# Patient Record
Sex: FEMALE | URBAN - METROPOLITAN AREA
[De-identification: names, ages, dates, MRNs, and addresses within clinical notes are randomized per-mention and may not be internally consistent; named-entity substitution may affect disease eponyms.]

---

## 2022-02-01 ENCOUNTER — OUT OF OFFICE VISIT (OUTPATIENT)
Dept: URBAN - METROPOLITAN AREA MEDICAL CENTER 9 | Facility: MEDICAL CENTER | Age: 47
End: 2022-02-01
Payer: COMMERCIAL

## 2022-02-01 DIAGNOSIS — R11.0 AM NAUSEA: ICD-10-CM

## 2022-02-01 DIAGNOSIS — R10.13 ABDOMINAL DISCOMFORT, EPIGASTRIC: ICD-10-CM

## 2022-02-01 DIAGNOSIS — K85.80 OTHER ACUTE PANCREATITIS WITHOUT INFECTION OR NECROSIS: ICD-10-CM

## 2022-02-01 DIAGNOSIS — K59.09 CHANGE IN BOWEL MOVEMENTS INTERMITTENT CONSTIPATION. URGENCY IN THE MORNING.: ICD-10-CM

## 2022-02-01 PROCEDURE — 99232 SBSQ HOSP IP/OBS MODERATE 35: CPT | Performed by: STUDENT IN AN ORGANIZED HEALTH CARE EDUCATION/TRAINING PROGRAM

## 2022-02-01 PROCEDURE — G8427 DOCREV CUR MEDS BY ELIG CLIN: HCPCS | Performed by: STUDENT IN AN ORGANIZED HEALTH CARE EDUCATION/TRAINING PROGRAM

## 2022-02-01 PROCEDURE — 99222 1ST HOSP IP/OBS MODERATE 55: CPT | Performed by: STUDENT IN AN ORGANIZED HEALTH CARE EDUCATION/TRAINING PROGRAM

## 2022-02-04 ENCOUNTER — OUT OF OFFICE VISIT (OUTPATIENT)
Dept: URBAN - METROPOLITAN AREA MEDICAL CENTER 9 | Facility: MEDICAL CENTER | Age: 47
End: 2022-02-04
Payer: COMMERCIAL

## 2022-02-04 DIAGNOSIS — K59.09 CHANGE IN BOWEL MOVEMENTS INTERMITTENT CONSTIPATION. URGENCY IN THE MORNING.: ICD-10-CM

## 2022-02-04 DIAGNOSIS — K85.80 AUTOIMMUNE PANCREATITIS: ICD-10-CM

## 2022-02-04 PROCEDURE — 99232 SBSQ HOSP IP/OBS MODERATE 35: CPT | Performed by: INTERNAL MEDICINE

## 2022-02-07 ENCOUNTER — OFFICE VISIT (OUTPATIENT)
Dept: URBAN - METROPOLITAN AREA CLINIC 40 | Facility: CLINIC | Age: 47
End: 2022-02-07
Payer: COMMERCIAL

## 2022-02-07 ENCOUNTER — WEB ENCOUNTER (OUTPATIENT)
Dept: URBAN - METROPOLITAN AREA CLINIC 40 | Facility: CLINIC | Age: 47
End: 2022-02-07

## 2022-02-07 DIAGNOSIS — Z87.19 HISTORY OF PANCREATITIS: ICD-10-CM

## 2022-02-07 DIAGNOSIS — D64.89 ANEMIA DUE TO OTHER CAUSE: ICD-10-CM

## 2022-02-07 DIAGNOSIS — Z12.11 ENCOUNTER FOR SCREENING COLONOSCOPY: ICD-10-CM

## 2022-02-07 DIAGNOSIS — R10.13 EPIGASTRIC ABDOMINAL PAIN: ICD-10-CM

## 2022-02-07 PROCEDURE — 99203 OFFICE O/P NEW LOW 30 MIN: CPT | Performed by: INTERNAL MEDICINE

## 2022-02-07 NOTE — HPI-TODAY'S VISIT:
Carol Black is a 46 year old female who was admitted to [facility] on 1/31/2022 for Acute pancreatitis, unspecified complication status, unspecified pancreatitis type. Seen by our provideres. The patient with a known history of HTN, Constipation, S/P Gastric bypass surgery, Anemia of chronic disease, S/P IUD, and Obesity presented to ED at  with complaints of abdominal pain. She states that she was in her USOH until  she started having abdominal pain. She states that her pain got worse, epigastric, radiating to the back. It is also ssociated with nausea. No other complaints. In ED, found to have acute pancreatitis on CT abdomen with elevated lipase >3K. Rapid COVID negative. The patient states her pain remains the same this am. She has no GI issue except constipation. She has never had endoscopy procedures in the past. She uses Laxative OTC for bowel movement and she moves her bowel every 2-3 days. She took Tylenol yesterday for pain and she denies using NSAID's. No family history of pancreatic disease. No history of hyperlipidemia. No diabetes. No ETOH. No Tobacco abuse. The patient denies dyspepsia, dysphagia, odynophagia, hemoptysis, hematemesis, vomiting, regurgitation, melena, diarrhea, hematochezia, fever, chills, chest pain, SOB, or any other GI complaints today. She denies ETOH, Tobacco, and Illicit drug abuse. She is up to date with Flu and COVID vaccine 3/3.  COVID negative. Received Dextran Fe infusion for Hgb> 12.0. Pantoprazole 40 mg once daily.  Start Amitiza 24 mcg twice daily. Triglycerides 55. Lisinopril Class III drug-induced pancreatitis. Plan was to f/u as OP in order to schedule EGD, and Colonoscopy at later time as an outpatient for further evaluation of anemia.

## 2022-02-07 NOTE — HPI-TODAY'S VISIT:
Her hemoglobin at discharge on February 5 was 9.3 with hematocrit 32.1 and MCV of 70.4.  Normal platelets and WBC was only mildly elevated in setting of acute pancreatitis at 12.39.  No evidence of urinary tract infection.  IgG4 normal. She admits that her epigastric pain has improved but is still present.  No nausea, vomiting.  Tolerating liquid and soft diet.  She is trying to advance slowly.  Taking tramadol once or twice a day.  No NSAID use.  Denies any rectal bleeding.  No vaginal bleeding.  Has IUD in place and states she has had no breakthrough bleeding.  Not currently on oral iron which she took before her admission to the hospital.  She has not been seen by hematologist for anemia even since her bypass surgery.  States her colonoscopy was normal 10 years ago while living in [place], [place].

## 2022-03-30 ENCOUNTER — OFFICE VISIT (OUTPATIENT)
Dept: URBAN - METROPOLITAN AREA SURGERY CENTER 30 | Facility: SURGERY CENTER | Age: 47
End: 2022-03-30
Payer: COMMERCIAL

## 2022-03-30 ENCOUNTER — CLAIMS CREATED FROM THE CLAIM WINDOW (OUTPATIENT)
Dept: URBAN - METROPOLITAN AREA CLINIC 4 | Facility: CLINIC | Age: 47
End: 2022-03-30
Payer: COMMERCIAL

## 2022-03-30 DIAGNOSIS — K31.89 FOCAL FOVEOLAR HYPERPLASIA: ICD-10-CM

## 2022-03-30 DIAGNOSIS — K31.A0 GASTRIC INTESTINAL METAPLASIA, UNSPECIFIED: ICD-10-CM

## 2022-03-30 DIAGNOSIS — D12.3 BENIGN NEOPLASM OF TRANSVERSE COLON: ICD-10-CM

## 2022-03-30 DIAGNOSIS — Z12.11 COLON CANCER SCREENING: ICD-10-CM

## 2022-03-30 DIAGNOSIS — D12.3 ADENOMA OF TRANSVERSE COLON: ICD-10-CM

## 2022-03-30 DIAGNOSIS — K29.60 ADENOPAPILLOMATOSIS GASTRICA: ICD-10-CM

## 2022-03-30 PROCEDURE — 88341 IMHCHEM/IMCYTCHM EA ADD ANTB: CPT | Performed by: PATHOLOGY

## 2022-03-30 PROCEDURE — 88305 TISSUE EXAM BY PATHOLOGIST: CPT | Performed by: PATHOLOGY

## 2022-03-30 PROCEDURE — 45380 COLONOSCOPY AND BIOPSY: CPT | Performed by: INTERNAL MEDICINE

## 2022-03-30 PROCEDURE — G8907 PT DOC NO EVENTS ON DISCHARG: HCPCS | Performed by: INTERNAL MEDICINE

## 2022-03-30 PROCEDURE — 43239 EGD BIOPSY SINGLE/MULTIPLE: CPT | Performed by: INTERNAL MEDICINE

## 2022-03-30 PROCEDURE — 88342 IMHCHEM/IMCYTCHM 1ST ANTB: CPT | Performed by: PATHOLOGY

## 2022-04-07 ENCOUNTER — TELEPHONE ENCOUNTER (OUTPATIENT)
Dept: URBAN - METROPOLITAN AREA CLINIC 40 | Facility: CLINIC | Age: 47
End: 2022-04-07

## 2022-04-11 ENCOUNTER — TELEPHONE ENCOUNTER (OUTPATIENT)
Dept: URBAN - METROPOLITAN AREA CLINIC 74 | Facility: CLINIC | Age: 47
End: 2022-04-11

## 2022-04-11 ENCOUNTER — OFFICE VISIT (OUTPATIENT)
Dept: URBAN - METROPOLITAN AREA TELEHEALTH 2 | Facility: TELEHEALTH | Age: 47
End: 2022-04-11
Payer: COMMERCIAL

## 2022-04-11 DIAGNOSIS — D64.89 OTHER SPECIFIED ANEMIAS: ICD-10-CM

## 2022-04-11 DIAGNOSIS — R10.13 EPIGASTRIC ABDOMINAL PAIN: ICD-10-CM

## 2022-04-11 DIAGNOSIS — Z87.19 HISTORY OF PANCREATITIS: ICD-10-CM

## 2022-04-11 DIAGNOSIS — Z98.84 HISTORY OF GASTRIC BYPASS: ICD-10-CM

## 2022-04-11 PROCEDURE — 99213 OFFICE O/P EST LOW 20 MIN: CPT | Performed by: INTERNAL MEDICINE

## 2022-04-11 RX ORDER — LUBIPROSTONE 24 UG/1
1 CAPSULE WITH FOOD AND WATER CAPSULE, GELATIN COATED ORAL TWICE A DAY
Qty: 60 | Refills: 1 | OUTPATIENT

## 2022-04-11 NOTE — HPI-TODAY'S VISIT:
Her hemoglobin at discharge on February 5 was 9.3 with hematocrit 32.1 and MCV of 70.4.  Normal platelets and WBC was only mildly elevated in setting of acute pancreatitis at 12.39.  No evidence of urinary tract infection.  IgG4 normal. She admits that her epigastric pain has improved but is still present.  No nausea, vomiting.  Tolerating liquid and soft diet.  She is trying to advance slowly.  Taking tramadol once or twice a day.  No NSAID use.  Denies any rectal bleeding.  No vaginal bleeding.  Has IUD in place and states she has had no breakthrough bleeding.  Not currently on oral iron which she took before her admission to the hospital.  She has not been seen by hematologist for anemia even since her bypass surgery.  States her colonoscopy was normal 10 years ago while living in [place], [place]. She had a EGD March 7, 2022 where she had diffuse, mild gastritis.  Normal-appearing jejunum.  Biopsies obtained.  No evidence of GERD or peptic ulcer disease.  Per pathology, jejunal biopsy normal.  Gastric biopsies with inactive gastritis.  Possibly treated H. pylori gastritis.  No evidence of intestinal metaplasia, dysplasia.  The findings from the stomach also could have been due to prior use of PPIs.  Follow-up H. pylori test recommended. Colonoscopy completed March 30, 2022 nonbleeding internal hemorrhoids, multiple large diverticula in the left colon as well as the ascending colon perianal skin tags noted.  A 4 mm polyp was removed from the transverse colon, consistent with tubular adenoma.  It was recommended she have colonoscopy for surveillance in 5 years time. She has lost 13 pounds recently, eating more plant-based.

## 2022-04-11 NOTE — HPI-TODAY'S VISIT:
Carol Black is a 46 year old female who was admitted to [facility] on 1/31/2022 for Acute pancreatitis, unspecified complication status, unspecified pancreatitis type. Seen by our provideres. The patient with a known history of HTN, Constipation, S/P Gastric bypass surgery, Anemia of chronic disease, S/P IUD, and Obesity presented to ED at  with complaints of abdominal pain. In ED, found to have acute pancreatitis on CT abdomen with elevated lipase >3K. Rapid COVID negative. She denies using NSAID's. No family history of pancreatic disease. No history of hyperlipidemia. No diabetes. No ETOH. No Tobacco abuse. She denies ETOH, Tobacco, and Illicit drug abuse. She is up to date with Flu and COVID vaccine 3/3. COVID negative with recent hospital admission. Received Dextran Fe infusion for Hgb> 12.0. Pantoprazole 40 mg once daily.  Start Amitiza 24 mcg twice daily. Triglycerides 55. Lisinopril Class III drug-induced pancreatitis suspected. Plan was to f/u as OP in order to schedule EGD, and Colonoscopy at later time as an outpatient for further evaluation of anemia.

## 2022-07-05 ENCOUNTER — OFFICE VISIT (OUTPATIENT)
Dept: URBAN - METROPOLITAN AREA CLINIC 39 | Facility: CLINIC | Age: 47
End: 2022-07-05

## 2022-07-18 ENCOUNTER — OFFICE VISIT (OUTPATIENT)
Dept: URBAN - METROPOLITAN AREA TELEHEALTH 2 | Facility: TELEHEALTH | Age: 47
End: 2022-07-18
Payer: COMMERCIAL

## 2022-07-18 ENCOUNTER — DASHBOARD ENCOUNTERS (OUTPATIENT)
Age: 47
End: 2022-07-18

## 2022-07-18 ENCOUNTER — TELEPHONE ENCOUNTER (OUTPATIENT)
Dept: URBAN - METROPOLITAN AREA CLINIC 40 | Facility: CLINIC | Age: 47
End: 2022-07-18

## 2022-07-18 DIAGNOSIS — D64.9 ANEMIA: ICD-10-CM

## 2022-07-18 DIAGNOSIS — R10.13 EPIGASTRIC ABDOMINAL PAIN: ICD-10-CM

## 2022-07-18 DIAGNOSIS — Z87.19 HISTORY OF PANCREATITIS: ICD-10-CM

## 2022-07-18 DIAGNOSIS — K59.01 CONSTIPATION: ICD-10-CM

## 2022-07-18 PROBLEM — 14760008 CONSTIPATION: Status: ACTIVE | Noted: 2022-04-11

## 2022-07-18 PROCEDURE — 99213 OFFICE O/P EST LOW 20 MIN: CPT | Performed by: INTERNAL MEDICINE

## 2022-07-18 RX ORDER — LUBIPROSTONE 24 UG/1
1 CAPSULE WITH FOOD AND WATER CAPSULE, GELATIN COATED ORAL TWICE A DAY
Qty: 60 | Refills: 1 | Status: ACTIVE | COMMUNITY

## 2022-07-18 RX ORDER — LUBIPROSTONE 24 UG/1
1 CAPSULE WITH FOOD AND WATER CAPSULE, GELATIN COATED ORAL TWICE A DAY
OUTPATIENT

## 2022-07-18 NOTE — HPI-TODAY'S VISIT:
Her hemoglobin at discharge on February 5 was 9.3 with hematocrit 32.1 and MCV of 70.4.  Normal platelets and WBC was only mildly elevated in setting of acute pancreatitis at 12.39.  No evidence of urinary tract infection.  IgG4 normal. She admits that her epigastric pain has improved but is still present.  No nausea, vomiting.  Tolerating liquid and soft diet.  She is trying to advance slowly.  Taking tramadol once or twice a day.  No NSAID use.  Denies any rectal bleeding.  No vaginal bleeding.  Has IUD in place and states she has had no breakthrough bleeding.  Not currently on oral iron which she took before her admission to the hospital.  She has not been seen by hematologist for anemia even since her bypass surgery.  States her colonoscopy was normal 10 years ago while living in [place], [place]. She had a EGD March 7, 2022 where she had diffuse, mild gastritis.  Normal-appearing jejunum.  Biopsies obtained.  No evidence of GERD or peptic ulcer disease.  Per pathology, jejunal biopsy normal.  Gastric biopsies with inactive gastritis.  Possibly treated H. pylori gastritis.  No evidence of intestinal metaplasia, dysplasia.  The findings from the stomach also could have been due to prior use of PPIs.  Follow-up H. pylori test recommended. Colonoscopy completed March 30, 2022 nonbleeding internal hemorrhoids, multiple large diverticula in the left colon as well as the ascending colon perianal skin tags noted.  A 4 mm polyp was removed from the transverse colon, consistent with tubular adenoma.  It was recommended she have colonoscopy for surveillance in 5 years time. She has no complaints today, taking Amitiza as needed. She is off ppi , has not completed Hpylori breath test, no abdominal pain, less reflux with modified diet and weight loss. Suffering currently from allergic rhinitis.

## 2022-07-18 NOTE — HPI-TODAY'S VISIT:
Carol Black is a 47 year old female, last seen for f/u 4/11/22,  who was admitted to [facility] on 1/31/2022 for Acute pancreatitis, unspecified complication status, unspecified pancreatitis type. Seen by our provideres. The patient with a known history of HTN, Constipation, S/P Gastric bypass surgery, Anemia of chronic disease, S/P IUD, and Obesity presented to ED at  with complaints of abdominal pain. In ED, found to have acute pancreatitis on CT abdomen with elevated lipase >3K. Rapid COVID negative. She denies using NSAID's. No family history of pancreatic disease. No history of hyperlipidemia. No diabetes. No ETOH. No Tobacco abuse. She denies ETOH, Tobacco, and Illicit drug abuse. She is up to date with Flu and COVID vaccine 3/3. COVID negative with recent hospital admission. Received Dextran Fe infusion for Hgb> 12.0. Pantoprazole 40 mg once daily.  Start Amitiza 24 mcg twice daily. Triglycerides 55. Lisinopril Class III drug-induced pancreatitis suspected. Plan was to f/u as OP in order to schedule EGD, and Colonoscopy at later time as an outpatient for further evaluation of anemia.